# Patient Record
Sex: FEMALE | Race: WHITE | Employment: FULL TIME | URBAN - METROPOLITAN AREA
[De-identification: names, ages, dates, MRNs, and addresses within clinical notes are randomized per-mention and may not be internally consistent; named-entity substitution may affect disease eponyms.]

---

## 2017-06-20 ENCOUNTER — LAB ENCOUNTER (OUTPATIENT)
Dept: LAB | Age: 47
End: 2017-06-20
Attending: INTERNAL MEDICINE
Payer: COMMERCIAL

## 2017-06-20 DIAGNOSIS — M13.0 POLYARTHRITIS: ICD-10-CM

## 2017-06-20 DIAGNOSIS — M35.00 SICCA, UNSPECIFIED TYPE (HCC): ICD-10-CM

## 2017-06-20 DIAGNOSIS — R53.83 FATIGUE, UNSPECIFIED TYPE: ICD-10-CM

## 2017-06-20 PROCEDURE — 85652 RBC SED RATE AUTOMATED: CPT

## 2017-06-20 PROCEDURE — 80053 COMPREHEN METABOLIC PANEL: CPT

## 2017-06-20 PROCEDURE — 84443 ASSAY THYROID STIM HORMONE: CPT

## 2017-06-20 PROCEDURE — 36415 COLL VENOUS BLD VENIPUNCTURE: CPT

## 2017-06-20 PROCEDURE — 82607 VITAMIN B-12: CPT

## 2017-06-20 PROCEDURE — 86140 C-REACTIVE PROTEIN: CPT

## 2017-06-20 PROCEDURE — 86038 ANTINUCLEAR ANTIBODIES: CPT

## 2017-06-20 PROCEDURE — 86235 NUCLEAR ANTIGEN ANTIBODY: CPT

## 2017-06-20 PROCEDURE — 85025 COMPLETE CBC W/AUTO DIFF WBC: CPT

## 2017-06-20 PROCEDURE — 86431 RHEUMATOID FACTOR QUANT: CPT

## 2017-06-20 PROCEDURE — 86200 CCP ANTIBODY: CPT

## 2017-06-20 PROCEDURE — 82306 VITAMIN D 25 HYDROXY: CPT

## 2017-10-03 ENCOUNTER — LAB SERVICES (OUTPATIENT)
Dept: OTHER | Age: 47
End: 2017-10-03

## 2017-10-03 ENCOUNTER — CHARTING TRANS (OUTPATIENT)
Dept: OTHER | Age: 47
End: 2017-10-03

## 2017-10-03 LAB — RAPID STREP GROUP A: NORMAL

## 2017-10-03 ASSESSMENT — PAIN SCALES - GENERAL: PAINLEVEL_OUTOF10: 5

## 2018-04-21 ENCOUNTER — LAB SERVICES (OUTPATIENT)
Dept: OTHER | Age: 48
End: 2018-04-21

## 2018-04-21 ENCOUNTER — CHARTING TRANS (OUTPATIENT)
Dept: OTHER | Age: 48
End: 2018-04-21

## 2018-04-21 LAB — RAPID STREP GROUP A: NORMAL

## 2018-04-21 ASSESSMENT — PAIN SCALES - GENERAL: PAINLEVEL_OUTOF10: 0

## 2018-04-23 LAB — CULTURE STREP GRP A (STTH) HL: NORMAL

## 2018-10-16 PROCEDURE — 86038 ANTINUCLEAR ANTIBODIES: CPT | Performed by: INTERNAL MEDICINE

## 2018-10-16 PROCEDURE — 86235 NUCLEAR ANTIGEN ANTIBODY: CPT | Performed by: INTERNAL MEDICINE

## 2018-11-01 VITALS
WEIGHT: 120 LBS | TEMPERATURE: 98.3 F | HEIGHT: 64 IN | BODY MASS INDEX: 20.49 KG/M2 | RESPIRATION RATE: 18 BRPM | HEART RATE: 72 BPM

## 2018-11-02 VITALS
RESPIRATION RATE: 16 BRPM | HEART RATE: 94 BPM | DIASTOLIC BLOOD PRESSURE: 90 MMHG | TEMPERATURE: 97.9 F | SYSTOLIC BLOOD PRESSURE: 140 MMHG

## 2021-06-19 ENCOUNTER — LAB ENCOUNTER (OUTPATIENT)
Dept: LAB | Age: 51
End: 2021-06-19
Attending: INTERNAL MEDICINE
Payer: COMMERCIAL

## 2021-06-19 DIAGNOSIS — Z01.818 PRE-OP TESTING: ICD-10-CM

## 2021-08-05 ENCOUNTER — WALK IN (OUTPATIENT)
Dept: URGENT CARE | Age: 51
End: 2021-08-05

## 2021-08-05 VITALS
HEIGHT: 64 IN | TEMPERATURE: 97.9 F | SYSTOLIC BLOOD PRESSURE: 120 MMHG | RESPIRATION RATE: 16 BRPM | WEIGHT: 120 LBS | OXYGEN SATURATION: 99 % | HEART RATE: 79 BPM | DIASTOLIC BLOOD PRESSURE: 80 MMHG | BODY MASS INDEX: 20.49 KG/M2

## 2021-08-05 DIAGNOSIS — J01.90 ACUTE BACTERIAL SINUSITIS: Primary | ICD-10-CM

## 2021-08-05 DIAGNOSIS — R05.9 COUGH: ICD-10-CM

## 2021-08-05 DIAGNOSIS — B96.89 ACUTE BACTERIAL SINUSITIS: Primary | ICD-10-CM

## 2021-08-05 PROCEDURE — 99214 OFFICE O/P EST MOD 30 MIN: CPT | Performed by: NURSE PRACTITIONER

## 2021-08-05 RX ORDER — AZITHROMYCIN 250 MG/1
TABLET, FILM COATED ORAL
Qty: 6 TABLET | Refills: 0 | Status: SHIPPED | OUTPATIENT
Start: 2021-08-05

## 2021-08-05 RX ORDER — FLUTICASONE PROPIONATE 50 MCG
1 SPRAY, SUSPENSION (ML) NASAL DAILY
Qty: 16 G | Refills: 0 | Status: SHIPPED | OUTPATIENT
Start: 2021-08-05

## 2021-08-05 RX ORDER — METHYLPREDNISOLONE 4 MG/1
TABLET ORAL
COMMUNITY
Start: 2021-07-12

## 2021-08-05 RX ORDER — BENZONATATE 100 MG/1
100 CAPSULE ORAL 3 TIMES DAILY PRN
Qty: 21 CAPSULE | Refills: 0 | Status: SHIPPED | OUTPATIENT
Start: 2021-08-05

## 2021-08-05 ASSESSMENT — ENCOUNTER SYMPTOMS
GASTROINTESTINAL NEGATIVE: 1
STRIDOR: 0
SINUS PAIN: 1
CHEST TIGHTNESS: 0
COUGH: 1
SORE THROAT: 0
TROUBLE SWALLOWING: 0
CHILLS: 0
WHEEZING: 0
EYES NEGATIVE: 1
PSYCHIATRIC NEGATIVE: 1
SINUS PRESSURE: 1
FATIGUE: 0
FEVER: 0
SHORTNESS OF BREATH: 0
NEUROLOGICAL NEGATIVE: 1
RHINORRHEA: 1

## 2023-01-13 ENCOUNTER — WALK IN (OUTPATIENT)
Dept: URGENT CARE | Age: 53
End: 2023-01-13

## 2023-01-13 VITALS
SYSTOLIC BLOOD PRESSURE: 130 MMHG | BODY MASS INDEX: 20.49 KG/M2 | HEIGHT: 64 IN | OXYGEN SATURATION: 98 % | HEART RATE: 88 BPM | WEIGHT: 120 LBS | DIASTOLIC BLOOD PRESSURE: 60 MMHG | TEMPERATURE: 98.7 F | RESPIRATION RATE: 18 BRPM

## 2023-01-13 DIAGNOSIS — H92.01 OTALGIA OF RIGHT EAR: Primary | ICD-10-CM

## 2023-01-13 PROBLEM — H92.02 LEFT EAR PAIN: Status: ACTIVE | Noted: 2023-01-13

## 2023-01-13 PROCEDURE — 99213 OFFICE O/P EST LOW 20 MIN: CPT | Performed by: NURSE PRACTITIONER

## 2023-01-13 ASSESSMENT — ENCOUNTER SYMPTOMS
COUGH: 0
UNEXPECTED WEIGHT CHANGE: 0
FEVER: 1
DIAPHORESIS: 0
APPETITE CHANGE: 0
FATIGUE: 0
ACTIVITY CHANGE: 0
HEADACHES: 0
CHILLS: 0

## 2023-02-25 ENCOUNTER — OFFICE VISIT (OUTPATIENT)
Dept: FAMILY MEDICINE CLINIC | Facility: CLINIC | Age: 53
End: 2023-02-25
Payer: COMMERCIAL

## 2023-02-25 VITALS
SYSTOLIC BLOOD PRESSURE: 138 MMHG | OXYGEN SATURATION: 96 % | TEMPERATURE: 97 F | BODY MASS INDEX: 21 KG/M2 | HEIGHT: 64 IN | RESPIRATION RATE: 18 BRPM | DIASTOLIC BLOOD PRESSURE: 90 MMHG | HEART RATE: 90 BPM | WEIGHT: 123 LBS

## 2023-02-25 DIAGNOSIS — R21 RASH AND NONSPECIFIC SKIN ERUPTION: Primary | ICD-10-CM

## 2023-02-25 PROCEDURE — 3080F DIAST BP >= 90 MM HG: CPT | Performed by: NURSE PRACTITIONER

## 2023-02-25 PROCEDURE — 99203 OFFICE O/P NEW LOW 30 MIN: CPT | Performed by: NURSE PRACTITIONER

## 2023-02-25 PROCEDURE — 3008F BODY MASS INDEX DOCD: CPT | Performed by: NURSE PRACTITIONER

## 2023-02-25 PROCEDURE — 3075F SYST BP GE 130 - 139MM HG: CPT | Performed by: NURSE PRACTITIONER

## 2023-02-25 RX ORDER — METHYLPREDNISOLONE 4 MG/1
TABLET ORAL
Qty: 1 EACH | Refills: 0 | Status: SHIPPED | OUTPATIENT
Start: 2023-02-25

## 2023-04-24 ENCOUNTER — ANESTHESIA EVENT (OUTPATIENT)
Dept: SURGERY | Facility: HOSPITAL | Age: 53
End: 2023-04-24
Payer: COMMERCIAL

## 2023-04-24 ENCOUNTER — APPOINTMENT (OUTPATIENT)
Dept: CT IMAGING | Facility: HOSPITAL | Age: 53
DRG: 909 | End: 2023-04-24
Attending: EMERGENCY MEDICINE
Payer: COMMERCIAL

## 2023-04-24 ENCOUNTER — HOSPITAL ENCOUNTER (INPATIENT)
Facility: HOSPITAL | Age: 53
LOS: 2 days | Discharge: HOME | DRG: 909 | End: 2023-04-26
Attending: EMERGENCY MEDICINE | Admitting: HOSPITALIST
Payer: COMMERCIAL

## 2023-04-24 ENCOUNTER — ANESTHESIA (OUTPATIENT)
Dept: SURGERY | Facility: HOSPITAL | Age: 53
End: 2023-04-24
Payer: COMMERCIAL

## 2023-04-24 DIAGNOSIS — K63.1 PERFORATED SIGMOID COLON (HCC): Primary | ICD-10-CM

## 2023-04-24 DIAGNOSIS — K63.1 COLON PERFORATION (HCC): ICD-10-CM

## 2023-04-24 DIAGNOSIS — K63.1 PERFORATED SIGMOID COLON (HCC): ICD-10-CM

## 2023-04-24 DIAGNOSIS — K91.89 OTHER POSTPROCEDURAL COMPLICATIONS AND DISORDERS OF DIGESTIVE SYSTEM: Primary | ICD-10-CM

## 2023-04-24 PROBLEM — Z83.719 FAMILY HISTORY OF COLONIC POLYPS: Status: ACTIVE | Noted: 2023-04-24

## 2023-04-24 PROBLEM — D12.0 BENIGN NEOPLASM OF CECUM: Status: ACTIVE | Noted: 2023-04-24

## 2023-04-24 PROBLEM — Z80.0 FAMILY HISTORY OF COLON CANCER: Status: ACTIVE | Noted: 2023-04-24

## 2023-04-24 PROBLEM — Z12.11 SPECIAL SCREENING FOR MALIGNANT NEOPLASM OF COLON: Status: ACTIVE | Noted: 2023-04-24

## 2023-04-24 PROBLEM — Z83.71 FAMILY HISTORY OF COLONIC POLYPS: Status: ACTIVE | Noted: 2023-04-24

## 2023-04-24 PROBLEM — D12.4 BENIGN NEOPLASM OF DESCENDING COLON: Status: ACTIVE | Noted: 2023-04-24

## 2023-04-24 LAB
ALBUMIN SERPL-MCNC: 3.6 G/DL (ref 3.4–5)
ALBUMIN/GLOB SERPL: 1.4 {RATIO} (ref 1–2)
ALP LIVER SERPL-CCNC: 70 U/L
ALT SERPL-CCNC: 23 U/L
ANION GAP SERPL CALC-SCNC: 5 MMOL/L (ref 0–18)
AST SERPL-CCNC: 16 U/L (ref 15–37)
BASOPHILS # BLD AUTO: 0.06 X10(3) UL (ref 0–0.2)
BASOPHILS NFR BLD AUTO: 0.6 %
BILIRUB SERPL-MCNC: 0.5 MG/DL (ref 0.1–2)
BUN BLD-MCNC: 14 MG/DL (ref 7–18)
CALCIUM BLD-MCNC: 9.3 MG/DL (ref 8.5–10.1)
CHLORIDE SERPL-SCNC: 115 MMOL/L (ref 98–112)
CO2 SERPL-SCNC: 22 MMOL/L (ref 21–32)
CREAT BLD-MCNC: 0.49 MG/DL
EOSINOPHIL # BLD AUTO: 0.12 X10(3) UL (ref 0–0.7)
EOSINOPHIL NFR BLD AUTO: 1.3 %
ERYTHROCYTE [DISTWIDTH] IN BLOOD BY AUTOMATED COUNT: 12.1 %
GFR SERPLBLD BASED ON 1.73 SQ M-ARVRAT: 113 ML/MIN/1.73M2 (ref 60–?)
GLOBULIN PLAS-MCNC: 2.6 G/DL (ref 2.8–4.4)
GLUCOSE BLD-MCNC: 81 MG/DL (ref 70–99)
HCT VFR BLD AUTO: 42.1 %
HGB BLD-MCNC: 14.4 G/DL
IMM GRANULOCYTES # BLD AUTO: 0.03 X10(3) UL (ref 0–1)
IMM GRANULOCYTES NFR BLD: 0.3 %
LACTATE SERPL-SCNC: 0.8 MMOL/L (ref 0.4–2)
LYMPHOCYTES # BLD AUTO: 2.66 X10(3) UL (ref 1–4)
LYMPHOCYTES NFR BLD AUTO: 28.7 %
MCH RBC QN AUTO: 30.5 PG (ref 26–34)
MCHC RBC AUTO-ENTMCNC: 34.2 G/DL (ref 31–37)
MCV RBC AUTO: 89.2 FL
MONOCYTES # BLD AUTO: 0.55 X10(3) UL (ref 0.1–1)
MONOCYTES NFR BLD AUTO: 5.9 %
NEUTROPHILS # BLD AUTO: 5.84 X10 (3) UL (ref 1.5–7.7)
NEUTROPHILS # BLD AUTO: 5.84 X10(3) UL (ref 1.5–7.7)
NEUTROPHILS NFR BLD AUTO: 63.2 %
OSMOLALITY SERPL CALC.SUM OF ELEC: 294 MOSM/KG (ref 275–295)
PLATELET # BLD AUTO: 276 10(3)UL (ref 150–450)
POTASSIUM SERPL-SCNC: 4.1 MMOL/L (ref 3.5–5.1)
PROT SERPL-MCNC: 6.2 G/DL (ref 6.4–8.2)
RBC # BLD AUTO: 4.72 X10(6)UL
SODIUM SERPL-SCNC: 142 MMOL/L (ref 136–145)
WBC # BLD AUTO: 9.3 X10(3) UL (ref 4–11)

## 2023-04-24 PROCEDURE — 74177 CT ABD & PELVIS W/CONTRAST: CPT | Performed by: EMERGENCY MEDICINE

## 2023-04-24 PROCEDURE — 99223 1ST HOSP IP/OBS HIGH 75: CPT | Performed by: STUDENT IN AN ORGANIZED HEALTH CARE EDUCATION/TRAINING PROGRAM

## 2023-04-24 PROCEDURE — 0DQN0ZZ REPAIR SIGMOID COLON, OPEN APPROACH: ICD-10-PCS | Performed by: STUDENT IN AN ORGANIZED HEALTH CARE EDUCATION/TRAINING PROGRAM

## 2023-04-24 PROCEDURE — 3074F SYST BP LT 130 MM HG: CPT | Performed by: INTERNAL MEDICINE

## 2023-04-24 PROCEDURE — 99223 1ST HOSP IP/OBS HIGH 75: CPT | Performed by: INTERNAL MEDICINE

## 2023-04-24 PROCEDURE — 3078F DIAST BP <80 MM HG: CPT | Performed by: INTERNAL MEDICINE

## 2023-04-24 PROCEDURE — 0DJD4ZZ INSPECTION OF LOWER INTESTINAL TRACT, PERCUTANEOUS ENDOSCOPIC APPROACH: ICD-10-PCS | Performed by: STUDENT IN AN ORGANIZED HEALTH CARE EDUCATION/TRAINING PROGRAM

## 2023-04-24 RX ORDER — MIDAZOLAM HYDROCHLORIDE 1 MG/ML
INJECTION INTRAMUSCULAR; INTRAVENOUS AS NEEDED
Status: DISCONTINUED | OUTPATIENT
Start: 2023-04-24 | End: 2023-04-24 | Stop reason: SURG

## 2023-04-24 RX ORDER — HEPARIN SODIUM 5000 [USP'U]/ML
5000 INJECTION, SOLUTION INTRAVENOUS; SUBCUTANEOUS EVERY 8 HOURS SCHEDULED
Status: DISCONTINUED | OUTPATIENT
Start: 2023-04-24 | End: 2023-04-25

## 2023-04-24 RX ORDER — CLINDAMYCIN PHOSPHATE 900 MG/50ML
900 INJECTION INTRAVENOUS ONCE
Status: COMPLETED | OUTPATIENT
Start: 2023-04-24 | End: 2023-04-24

## 2023-04-24 RX ORDER — ACETAMINOPHEN 500 MG
1000 TABLET ORAL EVERY 8 HOURS SCHEDULED
Status: DISCONTINUED | OUTPATIENT
Start: 2023-04-24 | End: 2023-04-26

## 2023-04-24 RX ORDER — PROCHLORPERAZINE EDISYLATE 5 MG/ML
5 INJECTION INTRAMUSCULAR; INTRAVENOUS EVERY 8 HOURS PRN
Status: DISCONTINUED | OUTPATIENT
Start: 2023-04-24 | End: 2023-04-26

## 2023-04-24 RX ORDER — KETOROLAC TROMETHAMINE 30 MG/ML
30 INJECTION, SOLUTION INTRAMUSCULAR; INTRAVENOUS EVERY 6 HOURS
Status: DISCONTINUED | OUTPATIENT
Start: 2023-04-25 | End: 2023-04-26

## 2023-04-24 RX ORDER — ONDANSETRON 2 MG/ML
4 INJECTION INTRAMUSCULAR; INTRAVENOUS EVERY 6 HOURS PRN
Status: DISCONTINUED | OUTPATIENT
Start: 2023-04-24 | End: 2023-04-24 | Stop reason: HOSPADM

## 2023-04-24 RX ORDER — METRONIDAZOLE 500 MG/100ML
500 INJECTION, SOLUTION INTRAVENOUS EVERY 8 HOURS
Status: DISCONTINUED | OUTPATIENT
Start: 2023-04-25 | End: 2023-04-26

## 2023-04-24 RX ORDER — BUPIVACAINE HYDROCHLORIDE 2.5 MG/ML
INJECTION, SOLUTION EPIDURAL; INFILTRATION; INTRACAUDAL AS NEEDED
Status: DISCONTINUED | OUTPATIENT
Start: 2023-04-24 | End: 2023-04-24 | Stop reason: HOSPADM

## 2023-04-24 RX ORDER — HYDROMORPHONE HYDROCHLORIDE 1 MG/ML
0.2 INJECTION, SOLUTION INTRAMUSCULAR; INTRAVENOUS; SUBCUTANEOUS EVERY 5 MIN PRN
Status: DISCONTINUED | OUTPATIENT
Start: 2023-04-24 | End: 2023-04-24 | Stop reason: HOSPADM

## 2023-04-24 RX ORDER — MORPHINE SULFATE 2 MG/ML
1 INJECTION, SOLUTION INTRAMUSCULAR; INTRAVENOUS EVERY 2 HOUR PRN
Status: DISCONTINUED | OUTPATIENT
Start: 2023-04-24 | End: 2023-04-26

## 2023-04-24 RX ORDER — MORPHINE SULFATE 4 MG/ML
4 INJECTION, SOLUTION INTRAMUSCULAR; INTRAVENOUS EVERY 2 HOUR PRN
Status: DISCONTINUED | OUTPATIENT
Start: 2023-04-24 | End: 2023-04-26

## 2023-04-24 RX ORDER — SODIUM CHLORIDE, SODIUM LACTATE, POTASSIUM CHLORIDE, CALCIUM CHLORIDE 600; 310; 30; 20 MG/100ML; MG/100ML; MG/100ML; MG/100ML
INJECTION, SOLUTION INTRAVENOUS CONTINUOUS PRN
Status: DISCONTINUED | OUTPATIENT
Start: 2023-04-24 | End: 2023-04-24 | Stop reason: SURG

## 2023-04-24 RX ORDER — HYDROMORPHONE HYDROCHLORIDE 1 MG/ML
0.4 INJECTION, SOLUTION INTRAMUSCULAR; INTRAVENOUS; SUBCUTANEOUS EVERY 2 HOUR PRN
Status: DISCONTINUED | OUTPATIENT
Start: 2023-04-24 | End: 2023-04-26

## 2023-04-24 RX ORDER — HYDROMORPHONE HYDROCHLORIDE 1 MG/ML
0.8 INJECTION, SOLUTION INTRAMUSCULAR; INTRAVENOUS; SUBCUTANEOUS EVERY 2 HOUR PRN
Status: DISCONTINUED | OUTPATIENT
Start: 2023-04-24 | End: 2023-04-26

## 2023-04-24 RX ORDER — DEXAMETHASONE SODIUM PHOSPHATE 4 MG/ML
VIAL (ML) INJECTION AS NEEDED
Status: DISCONTINUED | OUTPATIENT
Start: 2023-04-24 | End: 2023-04-24 | Stop reason: SURG

## 2023-04-24 RX ORDER — OXYCODONE HYDROCHLORIDE 10 MG/1
10 TABLET ORAL EVERY 4 HOURS PRN
Status: DISCONTINUED | OUTPATIENT
Start: 2023-04-24 | End: 2023-04-26

## 2023-04-24 RX ORDER — HYDROMORPHONE HYDROCHLORIDE 1 MG/ML
0.6 INJECTION, SOLUTION INTRAMUSCULAR; INTRAVENOUS; SUBCUTANEOUS EVERY 5 MIN PRN
Status: DISCONTINUED | OUTPATIENT
Start: 2023-04-24 | End: 2023-04-24 | Stop reason: HOSPADM

## 2023-04-24 RX ORDER — PROCHLORPERAZINE EDISYLATE 5 MG/ML
5 INJECTION INTRAMUSCULAR; INTRAVENOUS EVERY 8 HOURS PRN
Status: DISCONTINUED | OUTPATIENT
Start: 2023-04-24 | End: 2023-04-24 | Stop reason: HOSPADM

## 2023-04-24 RX ORDER — ONDANSETRON 2 MG/ML
4 INJECTION INTRAMUSCULAR; INTRAVENOUS EVERY 6 HOURS PRN
Status: DISCONTINUED | OUTPATIENT
Start: 2023-04-24 | End: 2023-04-26

## 2023-04-24 RX ORDER — OXYCODONE HYDROCHLORIDE 5 MG/1
5 TABLET ORAL EVERY 4 HOURS PRN
Status: DISCONTINUED | OUTPATIENT
Start: 2023-04-24 | End: 2023-04-26

## 2023-04-24 RX ORDER — NALOXONE HYDROCHLORIDE 0.4 MG/ML
80 INJECTION, SOLUTION INTRAMUSCULAR; INTRAVENOUS; SUBCUTANEOUS AS NEEDED
Status: DISCONTINUED | OUTPATIENT
Start: 2023-04-24 | End: 2023-04-24 | Stop reason: HOSPADM

## 2023-04-24 RX ORDER — METRONIDAZOLE 500 MG/100ML
500 INJECTION, SOLUTION INTRAVENOUS ONCE
Status: COMPLETED | OUTPATIENT
Start: 2023-04-24 | End: 2023-04-26

## 2023-04-24 RX ORDER — CIPROFLOXACIN 2 MG/ML
400 INJECTION, SOLUTION INTRAVENOUS EVERY 12 HOURS
Status: DISCONTINUED | OUTPATIENT
Start: 2023-04-24 | End: 2023-04-24

## 2023-04-24 RX ORDER — ACETAMINOPHEN 500 MG
500 TABLET ORAL EVERY 4 HOURS PRN
Status: DISCONTINUED | OUTPATIENT
Start: 2023-04-24 | End: 2023-04-26

## 2023-04-24 RX ORDER — SODIUM CHLORIDE, SODIUM LACTATE, POTASSIUM CHLORIDE, CALCIUM CHLORIDE 600; 310; 30; 20 MG/100ML; MG/100ML; MG/100ML; MG/100ML
INJECTION, SOLUTION INTRAVENOUS CONTINUOUS
Status: DISCONTINUED | OUTPATIENT
Start: 2023-04-24 | End: 2023-04-24 | Stop reason: HOSPADM

## 2023-04-24 RX ORDER — LIDOCAINE HYDROCHLORIDE 10 MG/ML
INJECTION, SOLUTION EPIDURAL; INFILTRATION; INTRACAUDAL; PERINEURAL AS NEEDED
Status: DISCONTINUED | OUTPATIENT
Start: 2023-04-24 | End: 2023-04-24 | Stop reason: SURG

## 2023-04-24 RX ORDER — SODIUM CHLORIDE 9 MG/ML
INJECTION, SOLUTION INTRAVENOUS CONTINUOUS
Status: DISCONTINUED | OUTPATIENT
Start: 2023-04-24 | End: 2023-04-25

## 2023-04-24 RX ORDER — CIPROFLOXACIN 2 MG/ML
400 INJECTION, SOLUTION INTRAVENOUS EVERY 12 HOURS
Status: DISCONTINUED | OUTPATIENT
Start: 2023-04-25 | End: 2023-04-26

## 2023-04-24 RX ORDER — ONDANSETRON 2 MG/ML
INJECTION INTRAMUSCULAR; INTRAVENOUS AS NEEDED
Status: DISCONTINUED | OUTPATIENT
Start: 2023-04-24 | End: 2023-04-24 | Stop reason: SURG

## 2023-04-24 RX ORDER — HYDROMORPHONE HYDROCHLORIDE 1 MG/ML
0.4 INJECTION, SOLUTION INTRAMUSCULAR; INTRAVENOUS; SUBCUTANEOUS EVERY 5 MIN PRN
Status: DISCONTINUED | OUTPATIENT
Start: 2023-04-24 | End: 2023-04-24 | Stop reason: HOSPADM

## 2023-04-24 RX ORDER — ROCURONIUM BROMIDE 10 MG/ML
INJECTION, SOLUTION INTRAVENOUS AS NEEDED
Status: DISCONTINUED | OUTPATIENT
Start: 2023-04-24 | End: 2023-04-24 | Stop reason: SURG

## 2023-04-24 RX ORDER — MORPHINE SULFATE 2 MG/ML
2 INJECTION, SOLUTION INTRAMUSCULAR; INTRAVENOUS EVERY 2 HOUR PRN
Status: DISCONTINUED | OUTPATIENT
Start: 2023-04-24 | End: 2023-04-26

## 2023-04-24 RX ADMIN — LIDOCAINE HYDROCHLORIDE 50 MG: 10 INJECTION, SOLUTION EPIDURAL; INFILTRATION; INTRACAUDAL; PERINEURAL at 19:51:00

## 2023-04-24 RX ADMIN — ROCURONIUM BROMIDE 50 MG: 10 INJECTION, SOLUTION INTRAVENOUS at 19:51:00

## 2023-04-24 RX ADMIN — SODIUM CHLORIDE, SODIUM LACTATE, POTASSIUM CHLORIDE, CALCIUM CHLORIDE: 600; 310; 30; 20 INJECTION, SOLUTION INTRAVENOUS at 22:28:00

## 2023-04-24 RX ADMIN — ONDANSETRON 4 MG: 2 INJECTION INTRAMUSCULAR; INTRAVENOUS at 22:03:00

## 2023-04-24 RX ADMIN — SODIUM CHLORIDE, SODIUM LACTATE, POTASSIUM CHLORIDE, CALCIUM CHLORIDE: 600; 310; 30; 20 INJECTION, SOLUTION INTRAVENOUS at 19:51:00

## 2023-04-24 RX ADMIN — CLINDAMYCIN PHOSPHATE 900 MG: 900 INJECTION INTRAVENOUS at 19:57:00

## 2023-04-24 RX ADMIN — MIDAZOLAM HYDROCHLORIDE 2 MG: 1 INJECTION INTRAMUSCULAR; INTRAVENOUS at 19:49:00

## 2023-04-24 RX ADMIN — DEXAMETHASONE SODIUM PHOSPHATE 4 MG: 4 MG/ML VIAL (ML) INJECTION at 19:56:00

## 2023-04-24 NOTE — ED INITIAL ASSESSMENT (HPI)
Patient in after colonoscopy at [facility]. Patient denies abd pain or any complaints. However pt being sent for rule out bowel perforation. First scope of patient's. Multiple polyps removed. Perforation suspected per MD notes after scope. Four clips applied close to perforation site of suspicion per notes.

## 2023-04-24 NOTE — ED QUICK NOTES
Orders for admission, patient is aware of plan and ready to go upstairs. Any questions, please call ED RN Sobia Manuel at extension [phone].      Patient Covid vaccination status: Fully vaccinated     COVID Test Ordered in ED: None    COVID Suspicion at Admission: N/A    Running Infusions:      Mental Status/LOC at time of transport: AOx4    Other pertinent information:   CIWA score: N/A   NIH score:  N/A

## 2023-04-25 LAB
ALBUMIN SERPL-MCNC: 3 G/DL (ref 3.4–5)
ALBUMIN/GLOB SERPL: 1.2 {RATIO} (ref 1–2)
ALP LIVER SERPL-CCNC: 56 U/L
ALT SERPL-CCNC: 16 U/L
ANION GAP SERPL CALC-SCNC: 2 MMOL/L (ref 0–18)
APTT PPP: 32.6 SECONDS (ref 23.3–35.6)
AST SERPL-CCNC: 15 U/L (ref 15–37)
BASOPHILS # BLD AUTO: 0.05 X10(3) UL (ref 0–0.2)
BASOPHILS NFR BLD AUTO: 0.2 %
BILIRUB SERPL-MCNC: 0.7 MG/DL (ref 0.1–2)
BUN BLD-MCNC: 10 MG/DL (ref 7–18)
CALCIUM BLD-MCNC: 8.3 MG/DL (ref 8.5–10.1)
CHLORIDE SERPL-SCNC: 109 MMOL/L (ref 98–112)
CO2 SERPL-SCNC: 26 MMOL/L (ref 21–32)
CREAT BLD-MCNC: 0.63 MG/DL
EOSINOPHIL # BLD AUTO: 0 X10(3) UL (ref 0–0.7)
EOSINOPHIL NFR BLD AUTO: 0 %
ERYTHROCYTE [DISTWIDTH] IN BLOOD BY AUTOMATED COUNT: 11.9 %
GFR SERPLBLD BASED ON 1.73 SQ M-ARVRAT: 106 ML/MIN/1.73M2 (ref 60–?)
GLOBULIN PLAS-MCNC: 2.6 G/DL (ref 2.8–4.4)
GLUCOSE BLD-MCNC: 150 MG/DL (ref 70–99)
HCT VFR BLD AUTO: 38 %
HGB BLD-MCNC: 13 G/DL
IMM GRANULOCYTES # BLD AUTO: 0.13 X10(3) UL (ref 0–1)
IMM GRANULOCYTES NFR BLD: 0.6 %
INR BLD: 1.07 (ref 0.85–1.16)
LYMPHOCYTES # BLD AUTO: 0.71 X10(3) UL (ref 1–4)
LYMPHOCYTES NFR BLD AUTO: 3.5 %
MCH RBC QN AUTO: 30.6 PG (ref 26–34)
MCHC RBC AUTO-ENTMCNC: 34.2 G/DL (ref 31–37)
MCV RBC AUTO: 89.4 FL
MONOCYTES # BLD AUTO: 0.95 X10(3) UL (ref 0.1–1)
MONOCYTES NFR BLD AUTO: 4.7 %
NEUTROPHILS # BLD AUTO: 18.27 X10 (3) UL (ref 1.5–7.7)
NEUTROPHILS # BLD AUTO: 18.27 X10(3) UL (ref 1.5–7.7)
NEUTROPHILS NFR BLD AUTO: 91 %
OSMOLALITY SERPL CALC.SUM OF ELEC: 286 MOSM/KG (ref 275–295)
PLATELET # BLD AUTO: 245 10(3)UL (ref 150–450)
POTASSIUM SERPL-SCNC: 3.9 MMOL/L (ref 3.5–5.1)
PROT SERPL-MCNC: 5.6 G/DL (ref 6.4–8.2)
PROTHROMBIN TIME: 13.9 SECONDS (ref 11.6–14.8)
RBC # BLD AUTO: 4.25 X10(6)UL
SODIUM SERPL-SCNC: 137 MMOL/L (ref 136–145)
WBC # BLD AUTO: 20.1 X10(3) UL (ref 4–11)

## 2023-04-25 PROCEDURE — 99232 SBSQ HOSP IP/OBS MODERATE 35: CPT | Performed by: HOSPITALIST

## 2023-04-25 RX ORDER — MELATONIN
3 NIGHTLY PRN
Status: DISCONTINUED | OUTPATIENT
Start: 2023-04-25 | End: 2023-04-26

## 2023-04-25 RX ORDER — HEPARIN SODIUM 5000 [USP'U]/ML
5000 INJECTION, SOLUTION INTRAVENOUS; SUBCUTANEOUS EVERY 8 HOURS
Status: DISCONTINUED | OUTPATIENT
Start: 2023-04-25 | End: 2023-04-26

## 2023-04-25 RX ORDER — SIMETHICONE 80 MG
80 TABLET,CHEWABLE ORAL 4 TIMES DAILY PRN
Status: DISCONTINUED | OUTPATIENT
Start: 2023-04-25 | End: 2023-04-26

## 2023-04-25 NOTE — PROGRESS NOTES
Patient A&Ox4. Vital signs stable on room air. Pain 8/10; toradol, and dilaudid given. asleep upon reassessment. Abdomen soft, flat, nondistended, and tender. Bowel sounds present; active. Patient not passing gas. Denies nausea, vomiting, diarrhea. Lap sites and midline incision clean, dry, intact, closed with skin glue and open to air. Clear liquid diet being tolerated well. Patient due to void after removal of styles catheter. JOSÉ MIGUEL drain to suction with serosanguinous output. Patient and  updated on plan of care. Questions and concerns discussed.

## 2023-04-25 NOTE — OPERATIVE REPORT
OPERATIVE NOTE    Dang Harden Location: OR   CSN [id number] MRN [id number]   Admission Date 4/24/2023 Operation Date 4/24/2023   Attending Physician Abdirashid Lam MD Operating Physician Vern Snellen, MD     PREOPERATIVE DIAGNOSIS  Colonic perforation    POSTOPERATIVE DIAGNOSIS  Perforation of the sigmoid colon    PROCEDURE PERFORMED  Diagnostic laparoscopy  Laparoscopic mobilization of sigmoid colon  Open debridement of sigmoid colon perforation  Primary repair sigmoid perforation    Beata Parkinson MD    ASSISTANTS  Sai Weiss MD    ANESTHESIA  General    INDICATIONS  This is a 67-year-old woman who presented to the outpatient surgical center for colonoscopy earlier today. During the colonoscopy patient underwent multiple Endo mucosal resections for polyps. In the sigmoid/descending colon, the gastroenterologist was concerned that there was a perforation. Patient was sent to the emergency room for further work-up and management. On CT imaging, patient was found to have extravasation of contrast as well as foci of free air concerning for continued perforation. Patient was outside the window for endoscopic repair. General surgery was consulted and exploration with repair of the colon was indicated. FINDINGS   Approximately 1.5 cm perforation in the mesenteric side of the sigmoid colon, 4 endoscopic clips noted near the perforation but the colonic wall was still , no gross spillage of any stool or colonic contents    FINDINGS/DESCRIPTION OF PROCEDURE  After informed consent, patient was brought to the operating room and placed in supine position. Bilateral SCDs were placed and pre-operative antibiotics administered. Anesthesia was induced without any complication. Patient was further placed in the lithotomy position with rectal irrigation and styles placement. Patient was prepped and draped in the usual sterile fashion.  Time out was performed confirming patient, procedure, and site.    Veress needle was used to gain pneumoperitoneum. Using blade, small incision for 5 mm port was made at the site of Sabillon's point. Veress needle was inserted with audible clicks. Pneumoperitoneum was created with CO2. Optiview was used to gain entry into the abdomen. Upon entrance, no injury to any of the bowel or omentum was noted. Two more 5 mm ports were placed with direct visualization at the right periumbilical and right lower quadrant. Using graspers, the sigmoid colon and descending colon were explored looking for perforation. On the distal descending/proximal sigmoid, there was an area of hyperemia on the mesenteric side. At this point, we decided to mobilize the white line of Toldt and explore this area more thoroughly. Using the Enseal, the white line of Toldt was taken down superiorly and inferiorly to the area of hyperemic intestine, being careful to identify the ureter. Once the colon was adequately mobilized, the area of perforation was still not able to be identified. It was suspected at this time that perforation was in the mesentery of the colon. Because of this, we decided to make a midline incision and openly explore the colon. The umbilicus and pubis were both identified, marking the midline. Approximately 5 cm incision was made. Subcutaneous tissues were divided until the abdominal cavity was encountered. The sigmoid colon was then eviscerated. During colonoscopy, patient had ligating clips placed in an attempt to close the opening. These were palpated through the colonic wall. Site of perforation was just distal to this. Using this knowledge, we began exploring the mesentery just distal to the palpated clips. Area of perforation was quickly identified and found to be approximately 1.5 cm. The ligating clips were identified through the perforation and removed. These were sent for pathology as colonic clips. There was no gross spillage of stool.   Because the perforation was less than 50% of the colonic wall, we decided to do a primary repair. The edges of the perforation were debrided back to healthy colonic tissue. Using a 3-0 Vicryl, a Michelle stitch was placed. The repair was then imbricated with 3-0 silk Lembert sutures. Patient had a wide open lumen with no evidence of stricturing. Abdomen was irrigated with sterile saline and the midline incision closed in layers. Peritoneum was closed with an 0 Vicryl suture. Fascia was then closed with a #1 looped PDS. Subcutaneous tissues were closed with 3-0 Vicryl deep dermal.  Skin was closed with a 4-0 Monocryl running subcuticular. Port site were closed with 4-0 Monocryl. Wounds were cleaned and dressed with Dermabond. At the end of the case, all counts were correct. Patient tolerated procedure well. Patient was awakened and transferred to PACU in a hemodynamically stable condition.     SPECIMENS REMOVED  Colonic clips    ESTIMATED BLOOD LOSS  30 ml    COMPLICATIONS  None     Romel Mercado MD

## 2023-04-25 NOTE — ANESTHESIA POSTPROCEDURE EVALUATION
Joaquina Patient Status:  Emergency   Age/Gender 47year old female MRN [id number]   Location [address] [facility] Attending Julio Cesar Askew MD   [facility] Day # 0 PCP CHRISTOPHER Fajardo       Anesthesia Post-op Note    LAPAROSCOPIC LEFT COLON RESECTION    Procedure Summary     Date: 04/24/23 Room / Location: [address] [place] [address] OR 09 / [address] [facility] OR    Anesthesia Start: 1946 Anesthesia Stop: 0803    Procedure: LAPAROSCOPIC LEFT COLON RESECTION (Abdomen) Diagnosis:       Perforated sigmoid colon ([address]      (same as pre-op)    Surgeons: Julio Cesar Askew MD Anesthesiologist: Hugh Merida MD    Anesthesia Type: general ASA Status: 2 - Emergent          Anesthesia Type: general    Vitals Value Taken Time   /81 04/24/23 2227   Temp 97 04/24/23 2229   Pulse 106 04/24/23 2228   Resp 13 04/24/23 2228   SpO2 99 % 04/24/23 2228   Vitals shown include unvalidated device data. Patient Location: PACU    Anesthesia Type: general    Airway Patency: patent    Postop Pain Control: adequate    Mental Status: mildly sedated but able to meaningfully participate in the post-anesthesia evaluation    Nausea/Vomiting: none    Cardiopulmonary/Hydration status: stable euvolemic    Complications: no apparent anesthesia related complications    Postop vital signs: stable    Dental Exam: Unchanged from Preop    Patient to be discharged from PACU when criteria met.

## 2023-04-25 NOTE — PLAN OF CARE
Patient A&Ox4, RA, up w/ SB assist. Voiding. Reports moderate abdominal pain. Receiving scheduled toradol and tylenol for pain. Saline locked. Tolerating clear liquids. Receiving IV abx. Re Rash/redness noted to L hand. Patient denies pain or itching. MD aware. RN to continue monitoring rash and notify MD if worsens or spreads. Patient reports having 1 small \"dust colored\" BM. JOSÉ MIGUEL drain intact with SS drainage. Plan of care discussed w/ patient and family at bedside.     Problem: Patient/Family Goals  Goal: Patient/Family Long Term Goal  Description: Patient's Long Term Goal: Discharge home     Interventions:  - advance diet   - ambulate as tolerated   - monitor drain output  - See additional Care Plan goals for specific interventions  Outcome: Progressing  Goal: Patient/Family Short Term Goal  Description: Patient's Short Term Goal: tolerate pain     Interventions:   - prn pain meds  - See additional Care Plan goals for specific interventions  Outcome: Progressing     Problem: PAIN - ADULT  Goal: Verbalizes/displays adequate comfort level or patient's stated pain goal  Description: INTERVENTIONS:  - Encourage pt to monitor pain and request assistance  - Assess pain using appropriate pain scale  - Administer analgesics based on type and severity of pain and evaluate response  - Implement non-pharmacological measures as appropriate and evaluate response  - Consider cultural and social influences on pain and pain management  - Manage/alleviate anxiety  - Utilize distraction and/or relaxation techniques  - Monitor for opioid side effects  - Notify MD/LIP if interventions unsuccessful or patient reports new pain  - Anticipate increased pain with activity and pre-medicate as appropriate  Outcome: Progressing     Problem: GASTROINTESTINAL - ADULT  Goal: Minimal or absence of nausea and vomiting  Description: INTERVENTIONS:  - Maintain adequate hydration with IV or PO as ordered and tolerated  - Nasogastric tube to low intermittent suction as ordered  - Evaluate effectiveness of ordered antiemetic medications  - Provide nonpharmacologic comfort measures as appropriate  - Advance diet as tolerated, if ordered  - Obtain nutritional consult as needed  - Evaluate fluid balance  Outcome: Progressing  Goal: Maintains or returns to baseline bowel function  Description: INTERVENTIONS:  - Assess bowel function  - Maintain adequate hydration with IV or PO as ordered and tolerated  - Evaluate effectiveness of GI medications  - Encourage mobilization and activity  - Obtain nutritional consult as needed  - Establish a toileting routine/schedule  - Consider collaborating with pharmacy to review patient's medication profile  Outcome: Progressing  Goal: Maintains adequate nutritional intake (undernourished)  Description: INTERVENTIONS:  - Monitor percentage of each meal consumed  - Identify factors contributing to decreased intake, treat as appropriate  - Assist with meals as needed  - Monitor I&O, WT and lab values  - Obtain nutritional consult as needed  - Optimize oral hygiene and moisture  - Encourage food from home; allow for food preferences  - Enhance eating environment  Outcome: Progressing  Goal: Achieves appropriate nutritional intake (bariatric)  Description: INTERVENTIONS:  - Monitor for over-consumption  - Identify factors contributing to increased intake, treat as appropriate  - Monitor I&O, WT and lab values  - Obtain nutritional consult as needed  - Evaluate psychosocial factors contributing to over-consumption  Outcome: Progressing

## 2023-04-25 NOTE — ADDENDUM NOTE
Addendum  created 04/24/23 2359 by Antonette Recinos MD    Intraprocedure Event edited, Intraprocedure Meds edited

## 2023-04-25 NOTE — BRIEF OP NOTE
Pre-Operative Diagnosis: Perforated sigmoid colon ([facility] [address] [K63.1]     Post-Operative Diagnosis: same as pre-op      Procedure Performed:   Diagnostic laparoscopy  Laparoscopic mobilization of sigmoid colon  Open debridement of sigmoid colon perforation  Primary repair of sigmoid perforation      Surgeon(s) and Role:     * Severiano Moder, MD - Primary     Paulette Edwards MD - Surgical Assistant    Assistant(s):        Surgical Findings: Sigmoid perforation in the mesentery, four colonic clips identified     Specimen: Colonic clips     Estimated Blood Loss: Blood Output: 30 mL (4/24/2023 10:06 PM)      Dictation Number: None, see op report    Vern Snellen, MD  4/24/2023  10:25 PM

## 2023-04-25 NOTE — ANESTHESIA PROCEDURE NOTES
Airway  Date/Time: 4/24/2023 7:54 PM  Urgency: elective      General Information and Staff    Patient location during procedure: OR  Anesthesiologist: Adriana Johnson MD  Performed: anesthesiologist   Performed by: Adriana Johnson MD  Authorized by: Adriana Johnson MD      Indications and Patient Condition  Indications for airway management: anesthesia  Sedation level: deep  Preoxygenated: yes  Patient position: sniffing  Mask difficulty assessment: 1 - vent by mask    Final Airway Details  Final airway type: endotracheal airway      Successful airway: ETT  Cuffed: yes   Successful intubation technique: direct laryngoscopy  Endotracheal tube insertion site: oral  Blade: [place]  Blade size: #3  ETT size (mm): 7.0    Cormack-Lehane Classification: grade I - full view of glottis  Placement verified by: chest auscultation and capnometry   Cuff volume (mL): 7  Measured from: lips  ETT to lips (cm): 22  Number of attempts at approach: 1    Additional Comments  atraumatic

## 2023-04-26 VITALS
BODY MASS INDEX: 21 KG/M2 | OXYGEN SATURATION: 98 % | SYSTOLIC BLOOD PRESSURE: 128 MMHG | WEIGHT: 123 LBS | HEART RATE: 67 BPM | DIASTOLIC BLOOD PRESSURE: 79 MMHG | RESPIRATION RATE: 18 BRPM | TEMPERATURE: 97 F

## 2023-04-26 PROBLEM — K91.89 OTHER POSTPROCEDURAL COMPLICATIONS AND DISORDERS OF DIGESTIVE SYSTEM: Status: ACTIVE | Noted: 2023-04-26

## 2023-04-26 PROBLEM — K63.1 COLON PERFORATION (HCC): Status: RESOLVED | Noted: 2023-04-24 | Resolved: 2023-04-26

## 2023-04-26 PROBLEM — K91.89 OTHER POSTPROCEDURAL COMPLICATIONS AND DISORDERS OF DIGESTIVE SYSTEM: Status: RESOLVED | Noted: 2023-04-24 | Resolved: 2023-04-26

## 2023-04-26 LAB
ANION GAP SERPL CALC-SCNC: 4 MMOL/L (ref 0–18)
BASOPHILS # BLD AUTO: 0.04 X10(3) UL (ref 0–0.2)
BASOPHILS NFR BLD AUTO: 0.4 %
BUN BLD-MCNC: 9 MG/DL (ref 7–18)
CALCIUM BLD-MCNC: 9.1 MG/DL (ref 8.5–10.1)
CHLORIDE SERPL-SCNC: 111 MMOL/L (ref 98–112)
CO2 SERPL-SCNC: 26 MMOL/L (ref 21–32)
CREAT BLD-MCNC: 0.57 MG/DL
EOSINOPHIL # BLD AUTO: 0.14 X10(3) UL (ref 0–0.7)
EOSINOPHIL NFR BLD AUTO: 1.3 %
ERYTHROCYTE [DISTWIDTH] IN BLOOD BY AUTOMATED COUNT: 12.1 %
GFR SERPLBLD BASED ON 1.73 SQ M-ARVRAT: 109 ML/MIN/1.73M2 (ref 60–?)
GLUCOSE BLD-MCNC: 106 MG/DL (ref 70–99)
HCT VFR BLD AUTO: 35.6 %
HGB BLD-MCNC: 12 G/DL
IMM GRANULOCYTES # BLD AUTO: 0.05 X10(3) UL (ref 0–1)
IMM GRANULOCYTES NFR BLD: 0.5 %
LYMPHOCYTES # BLD AUTO: 2.31 X10(3) UL (ref 1–4)
LYMPHOCYTES NFR BLD AUTO: 21.9 %
MAGNESIUM SERPL-MCNC: 1.9 MG/DL (ref 1.6–2.6)
MCH RBC QN AUTO: 30.2 PG (ref 26–34)
MCHC RBC AUTO-ENTMCNC: 33.7 G/DL (ref 31–37)
MCV RBC AUTO: 89.4 FL
MONOCYTES # BLD AUTO: 0.79 X10(3) UL (ref 0.1–1)
MONOCYTES NFR BLD AUTO: 7.5 %
NEUTROPHILS # BLD AUTO: 7.2 X10 (3) UL (ref 1.5–7.7)
NEUTROPHILS # BLD AUTO: 7.2 X10(3) UL (ref 1.5–7.7)
NEUTROPHILS NFR BLD AUTO: 68.4 %
OSMOLALITY SERPL CALC.SUM OF ELEC: 291 MOSM/KG (ref 275–295)
PLATELET # BLD AUTO: 247 10(3)UL (ref 150–450)
POTASSIUM SERPL-SCNC: 3.4 MMOL/L (ref 3.5–5.1)
RBC # BLD AUTO: 3.98 X10(6)UL
SODIUM SERPL-SCNC: 141 MMOL/L (ref 136–145)
WBC # BLD AUTO: 10.5 X10(3) UL (ref 4–11)

## 2023-04-26 PROCEDURE — 99239 HOSP IP/OBS DSCHRG MGMT >30: CPT | Performed by: INTERNAL MEDICINE

## 2023-04-26 RX ORDER — POTASSIUM CHLORIDE 20 MEQ/1
40 TABLET, EXTENDED RELEASE ORAL ONCE
Status: COMPLETED | OUTPATIENT
Start: 2023-04-26 | End: 2023-04-26

## 2023-04-26 RX ORDER — ACETAMINOPHEN 500 MG
500 TABLET ORAL EVERY 4 HOURS PRN
Refills: 0 | Status: SHIPPED | COMMUNITY
Start: 2023-04-26

## 2023-04-26 NOTE — PLAN OF CARE
Patient alert and oriented x4. VSS, on room air. Patient received scheduled Tylenol and Toradol for pain control. Patient complains of gas pain and the inability to fall asleep. [facility] paged. Orders for simethicone and melatonin. Plan of care discussed. Call light within reach.      Problem: Patient/Family Goals  Goal: Patient/Family Long Term Goal  Description: Patient's Long Term Goal: Discharge home     Interventions:  - advance diet   - ambulate as tolerated   - monitor drain output  - See additional Care Plan goals for specific interventions  Outcome: Progressing  Goal: Patient/Family Short Term Goal  Description: Patient's Short Term Goal: tolerate pain     Interventions:   - prn pain meds  - See additional Care Plan goals for specific interventions  Outcome: Progressing     Problem: PAIN - ADULT  Goal: Verbalizes/displays adequate comfort level or patient's stated pain goal  Description: INTERVENTIONS:  - Encourage pt to monitor pain and request assistance  - Assess pain using appropriate pain scale  - Administer analgesics based on type and severity of pain and evaluate response  - Implement non-pharmacological measures as appropriate and evaluate response  - Consider cultural and social influences on pain and pain management  - Manage/alleviate anxiety  - Utilize distraction and/or relaxation techniques  - Monitor for opioid side effects  - Notify MD/LIP if interventions unsuccessful or patient reports new pain  - Anticipate increased pain with activity and pre-medicate as appropriate  Outcome: Progressing     Problem: RISK FOR INFECTION - ADULT  Goal: Absence of fever/infection during anticipated neutropenic period  Description: INTERVENTIONS  - Monitor WBC  - Administer growth factors as ordered  - Implement neutropenic guidelines  Outcome: Progressing     Problem: SAFETY ADULT - FALL  Goal: Free from fall injury  Description: INTERVENTIONS:  - Assess pt frequently for physical needs  - Identify cognitive and physical deficits and behaviors that affect risk of falls.   - [place] fall precautions as indicated by assessment.  - Educate pt/family on patient safety including physical limitations  - Instruct pt to call for assistance with activity based on assessment  - Modify environment to reduce risk of injury  - Provide assistive devices as appropriate  - Consider OT/PT consult to assist with strengthening/mobility  - Encourage toileting schedule  Outcome: Progressing     Problem: DISCHARGE PLANNING  Goal: Discharge to home or other facility with appropriate resources  Description: INTERVENTIONS:  - Identify barriers to discharge w/pt and caregiver  - Include patient/family/discharge partner in discharge planning  - Arrange for needed discharge resources and transportation as appropriate  - Identify discharge learning needs (meds, wound care, etc)  - Arrange for interpreters to assist at discharge as needed  - Consider post-discharge preferences of patient/family/discharge partner  - Complete POLST form as appropriate  - Assess patient's ability to be responsible for managing their own health  - Refer to Case Management Department for coordinating discharge planning if the patient needs post-hospital services based on physician/LIP order or complex needs related to functional status, cognitive ability or social support system  Outcome: Progressing     Problem: Altered Communication/Language Barrier  Goal: Patient/Family is able to understand and participate in their care  Description: Interventions:  - Assess communication ability and preferred communication style  - Implement communication aides and strategies  - Use visual cues when possible  - Listen attentively, be patient, do not interrupt  - Minimize distractions  - Allow time for understanding and response  - Establish method for patient to ask for assistance (call light)  - Provide an  as needed  - Communicate barriers and strategies to overcome with those who interact with patient  Outcome: Progressing     Problem: GASTROINTESTINAL - ADULT  Goal: Minimal or absence of nausea and vomiting  Description: INTERVENTIONS:  - Maintain adequate hydration with IV or PO as ordered and tolerated  - Nasogastric tube to low intermittent suction as ordered  - Evaluate effectiveness of ordered antiemetic medications  - Provide nonpharmacologic comfort measures as appropriate  - Advance diet as tolerated, if ordered  - Obtain nutritional consult as needed  - Evaluate fluid balance  Outcome: Progressing  Goal: Maintains or returns to baseline bowel function  Description: INTERVENTIONS:  - Assess bowel function  - Maintain adequate hydration with IV or PO as ordered and tolerated  - Evaluate effectiveness of GI medications  - Encourage mobilization and activity  - Obtain nutritional consult as needed  - Establish a toileting routine/schedule  - Consider collaborating with pharmacy to review patient's medication profile  Outcome: Progressing  Goal: Maintains adequate nutritional intake (undernourished)  Description: INTERVENTIONS:  - Monitor percentage of each meal consumed  - Identify factors contributing to decreased intake, treat as appropriate  - Assist with meals as needed  - Monitor I&O, WT and lab values  - Obtain nutritional consult as needed  - Optimize oral hygiene and moisture  - Encourage food from home; allow for food preferences  - Enhance eating environment  Outcome: Progressing  Goal: Achieves appropriate nutritional intake (bariatric)  Description: INTERVENTIONS:  - Monitor for over-consumption  - Identify factors contributing to increased intake, treat as appropriate  - Monitor I&O, WT and lab values  - Obtain nutritional consult as needed  - Evaluate psychosocial factors contributing to over-consumption  Outcome: Progressing

## 2023-04-26 NOTE — DISCHARGE INSTRUCTIONS
Home Care Instructions  LAPAROSCOPIC/ROBOTIC SURGERY      MEDICATIONS   You should anticipate moderate to severe pain for the first few days. You may use ibuprofen ( Motrin ) 600 mg 3 times a day with Tylenol 1000 mg 3 times a day     DIET   Your diet should be low fiber for 4 weeks    ACTIVITY   You should not drive for the first 3 to 5 days or if you are still requiring prescription pain medication. No lifting greater than 10 to 15 pounds for 3 weeks   Avoid strenuous activity such as running and physical workouts for 3 weeks. Normal daily activities are fine, such as climbing stairs   You may shower after 24 hours. The incisions can get wet but should not be under water in a bath. You may return to work as instructed by your surgeon. CARE OF SURGICAL SITE   Pain, colorful bruising and slight swelling at the incision sites is usually normal   If you experience fever, chills, inability to urinate, nausea with vomiting, severe diarrhea  or severe, cramping abdominal pain please contact your surgeon    APPOINTMENT   Call the office when you arrive home after surgery and make an appointment to see your surgeon in one week. Should you develop any problems prior to your scheduled appointment, do not hesitate  to call the office.

## 2023-04-26 NOTE — PROGRESS NOTES
Pt reports moderate gas and back pain this am. Tolerating clears. Advanced to full liquids. Denies nausea. IVSL. Abdominal incisions with skin glue CDI. JOSÉ MIGUEL to right abdomen with serosanguinous drainage in bulb. Up with standby assist. Voiding without difficulty. Plan of care reviewed. All questions answered. 1600 JOSÉ MIGUEL removed. AVS provided and reviewed in detain with pt and her  at the bedside.

## 2023-05-02 ENCOUNTER — OFFICE VISIT (OUTPATIENT)
Facility: LOCATION | Age: 53
End: 2023-05-02

## 2023-05-02 ENCOUNTER — TELEPHONE (OUTPATIENT)
Facility: LOCATION | Age: 53
End: 2023-05-02

## 2023-05-02 VITALS — HEART RATE: 106 BPM | TEMPERATURE: 98 F

## 2023-05-02 DIAGNOSIS — K63.1 COLON PERFORATION (HCC): Primary | ICD-10-CM

## 2023-05-02 DIAGNOSIS — Z98.890 S/P EXPLORATORY LAPAROTOMY: ICD-10-CM

## 2023-05-02 PROCEDURE — 99024 POSTOP FOLLOW-UP VISIT: CPT | Performed by: STUDENT IN AN ORGANIZED HEALTH CARE EDUCATION/TRAINING PROGRAM

## 2023-05-03 NOTE — TELEPHONE ENCOUNTER
received pts disab  And RTW forms and logged for processing. valid auth and AKIN received also.  There are 2 emails use 2nd email due to all forms are in that email

## 2023-05-11 NOTE — TELEPHONE ENCOUNTER
Pt called asking status on pending disab forms. Informed pt of our turnaround time but we should be getting to her forms soon.  Pt's first day off work 4/24/23  RTW6/6/23

## 2023-05-15 NOTE — TELEPHONE ENCOUNTER
Dr. Anna Marie Hall,      Please sign off on form if you agree to: 2 forms to sign off on please. Continous FMLA due to pt's recent Sx. Start: 4/24/23---RTW: 6/6/23  (Please place your signature on the first page only)    -Within your inbasket, Highlight the patient and hit \"Chart\" button. -In Chart Review, w/in the Encounter tab - click 1 time on the Telephone call encounter for 5/2/23 Scroll down the telephone encounter.  -Click \"scan on\" blue Hyperlink under \"Media\" heading for RTW Dr. Anna Marie Hall 5/15/23, Disab Dr. Anna Marie Hall 5/15/23 w/in the telephone enc.  -Click on Acknowledge button at the bottom right corner and left-click onto image, signature stamp appears and drag signature to Provider signature line. Stamp will turn blue. Close window.      Thank you,  [facility]

## 2023-05-16 NOTE — TELEPHONE ENCOUNTER
Forms completed and faxed to [facility] at [phone], confirmation rcv'd. Reciclata message sent to pt.

## 2023-05-30 ENCOUNTER — OFFICE VISIT (OUTPATIENT)
Facility: LOCATION | Age: 53
End: 2023-05-30

## 2023-05-30 VITALS — TEMPERATURE: 99 F

## 2023-05-30 DIAGNOSIS — K63.1 BOWEL PERFORATION (HCC): Primary | ICD-10-CM

## 2023-05-30 DIAGNOSIS — K63.5 SERRATED POLYP OF COLON: ICD-10-CM

## 2023-05-30 PROCEDURE — 99024 POSTOP FOLLOW-UP VISIT: CPT | Performed by: STUDENT IN AN ORGANIZED HEALTH CARE EDUCATION/TRAINING PROGRAM

## 2023-05-31 PROBLEM — K63.5 SERRATED POLYP OF COLON: Status: ACTIVE | Noted: 2023-05-31

## 2023-07-13 ENCOUNTER — GENETICS ENCOUNTER (OUTPATIENT)
Dept: GENETICS | Facility: HOSPITAL | Age: 53
End: 2023-07-13
Attending: GENETIC COUNSELOR, MS
Payer: COMMERCIAL

## 2023-07-13 ENCOUNTER — NURSE ONLY (OUTPATIENT)
Dept: HEMATOLOGY/ONCOLOGY | Facility: HOSPITAL | Age: 53
End: 2023-07-13
Attending: GENETIC COUNSELOR, MS
Payer: COMMERCIAL

## 2023-07-13 DIAGNOSIS — Z80.0 FAMILY HISTORY OF PANCREATIC CANCER: ICD-10-CM

## 2023-07-13 DIAGNOSIS — Z86.010 PERSONAL HISTORY OF COLONIC POLYPS: Primary | ICD-10-CM

## 2023-07-13 PROCEDURE — 36415 COLL VENOUS BLD VENIPUNCTURE: CPT

## 2023-07-13 PROCEDURE — 96040 HC GENETIC COUNSELING EA 30 MIN: CPT | Performed by: GENETIC COUNSELOR, MS

## 2023-08-01 ENCOUNTER — GENETICS ENCOUNTER (OUTPATIENT)
Dept: HEMATOLOGY/ONCOLOGY | Facility: HOSPITAL | Age: 53
End: 2023-08-01

## 2023-08-01 NOTE — PROGRESS NOTES
Referring Provider:        Kaz Marte MD     Additional Provider:     MD Ofelia Harris APRN     Reason for Referral:  Elysia Landrum had genetic testing performed on 013/23 because of a personal and family history of sessile serrated polyps, a maternal family history of cancer, and a limited paternal family history. Genetic Testing Result:  No known pathogenic variants were found in 56 genes including: APC*, MCKENZIE*, AXIN2, BARD1, BLM, BMPR1A, BRCA1, BRCA2, BRIP1, BUB1B, CDH1, CDK4, CDKN2A (p14ARF), CDKN2A ([id number]), CEP57*, CHEK2, CTNNA1, DICER1*, ENG*, EPCAM*, FLCN, GALNT12, GREM1*, HOXB13, KIT, MEN1*, MLH1*, MLH3*, MSH2*, MSH3*, MSH6*, MUTYH, NBN, NF1*, NTHL1, PALB2, PDGFRA, PMS2*, POLD1*, POLE, PTEN*, RAD50, RAD51C, RAD51D, RNF43, RPS20, SDHA*, SDHB, SDHC*, SDHD, SMAD4, SMARCA4, STK11, TP53, TSC1*, TSC2, VHL. A variant of uncertain significance, BRCA2 c.5702A>T ([id number]), was identified. Please refer to the report from [facility] ([id number]) for additional testing information. These results were discussed with Ms. Rock by phone on 08/01/23. Summary and Plan:  The etiology of Ms. Haq colorectal polyps remains unexplained. The limitations of the testing include the chance that a pathogenic variant in a gene other than those included in this panel might be the cause of colon polyps in Ms. Rock. At this time, no alteration in Ms. Rock's medical recommendations should be made based on the BRCA2 variant identified in Ms. Rock since it is currently undetermined if it is associated with an increased risk for cancer or if it is a benign polymorphism; notably, [facility] currently classifies this variant as favor polymorphism/likely benign (phone conversation with Sly Kwan, a genetic counselor at [facility] on 08/01/23).   As more families are tested and/or functional studies are performed, it is possible that this variant will be reclassified in the future. Ms. Diana Osorio should contact me on an annual basis to see if the VUS has been reclassified and to learn if there have been any updates in genetic testing that would apply to her. In the meantime, Ms. Rock and her relatives should speak with their physicians to discuss recommended medical management according to their personal and family history. Ms. Ciarra Mei lifetime risk for breast cancer is estimated to be 8.2% based on Tyrer-Cuzick model, version 8.        Cc:  Tor Brine

## 2024-02-12 NOTE — ED QUICK NOTES
Patient ambulatory to restroom, no complaints. Tolerated well.  Aware of POC no weight-bearing restrictions

## 2024-04-10 ENCOUNTER — OFFICE VISIT (OUTPATIENT)
Facility: LOCATION | Age: 54
End: 2024-04-10
Payer: COMMERCIAL

## 2024-04-10 VITALS — HEART RATE: 84 BPM | TEMPERATURE: 97 F

## 2024-04-10 DIAGNOSIS — K63.1 BOWEL PERFORATION (HCC): Primary | ICD-10-CM

## 2024-04-10 DIAGNOSIS — K63.5 SERRATED POLYP OF COLON: ICD-10-CM

## 2024-04-10 DIAGNOSIS — Z83.719 FAMILY HISTORY OF COLONIC POLYPS: ICD-10-CM

## 2024-04-10 PROCEDURE — 99213 OFFICE O/P EST LOW 20 MIN: CPT | Performed by: STUDENT IN AN ORGANIZED HEALTH CARE EDUCATION/TRAINING PROGRAM

## 2024-04-10 RX ORDER — SOD SULF/POT CHLORIDE/MAG SULF 1.479 G
TABLET ORAL
Qty: 24 TABLET | Refills: 0 | Status: SHIPPED | OUTPATIENT
Start: 2024-04-10

## 2024-04-10 NOTE — PROGRESS NOTES
Patient ID: Yaa Rock is a 53 year old female.    Chief Complaint   Patient presents with    [facility]     Est - f/u bowel perforation, colonoscopy, Maternal Colon cancer, No symptoms,        HPI: Toña Rock is a 53 year old female presents to clinic for follow-up.  Since last clinic appointment, patient has been doing well.  She denies any new issues.  She presents today for repeat colonoscopy.    Workup: None      Past Medical History  Past Medical History:    Food intolerance    Heartburn    Hemorrhoids    High cholesterol    no meds    Osteoarthritis    knees    Pain in joints    Uncomfortable fullness after meals    Wears glasses       Past Surgical History  Past Surgical History:   Procedure Laterality Date    Colonoscopy      perforated bowel 4/2023    Other surgical history      uterine ablation    Other surgical history      2023 repair perforated bowel       Medications  No current outpatient medications on file.       Allergies  Allergies   Allergen Reactions    Penicillins ITCHING       Social History  History   Smoking Status    Former    Types: Cigarettes   Smokeless Tobacco    Never     History   Alcohol Use    1.0 standard drink of alcohol/week    1 Glasses of wine per week     Comment: rare     History   Drug Use Unknown       Family History  Family History   Problem Relation Age of Onset    Colon Cancer Mother     Hypertension Mother     Diabetes Maternal Grandfather        Review of Systems  Review of Systems    Exam  Vitals:    04/10/24 0829   Pulse: 84   Temp: 97.2 °F (36.2 °C)     Physical Exam      Assessment/Plan  Assessment   Problem List Items Addressed This Visit          HCC Problems    Bowel perforation (HCC) - Primary    Relevant Medications    Sodium Sulfate-Mag Sulfate-KCl (SUTAB) 1604-555-738 MG Oral Tab       Gastrointestinal and Abdominal    Family history of colonic polyps    Relevant Medications    Sodium Sulfate-Mag Sulfate-KCl (SUTAB) 0463-155-907 MG Oral Tab     Serrated polyp of colon    Relevant Medications    Sodium Sulfate-Mag Sulfate-KCl (SUTAB) 9927-322-907 MG Oral Tab       Toña Rock is a 53 year old female with possible family history of colon cancer in her mom, family history of colon polyps, personal history of serrated colon polyp, status post perforation after colonoscopy requiring laparotomy and repair    Overall, patient is doing well  She presents today for repeat colonoscopy  Her last colonoscopy, a serrated colon polyp was found within the sigmoid colon  This was complicated by bowel perforation  It was recommended that she undergo repeat colonoscopy within a year  Since surgery, patient denies any issues  We will plan for colonoscopy, possible biopsy  Procedure explained to the patient  Risks including bleeding, pain, infection, perforation, and need for further intervention all discussed  Bowel prep previously discussed with the patient via office staff  All questions were answered    Patient can call my office for any questions or concerns       Sumi Armstrong MD  General Surgery  [facility]     CC:  CHRISTOPHER Mays

## 2024-04-11 ENCOUNTER — TELEPHONE (OUTPATIENT)
Facility: LOCATION | Age: 54
End: 2024-04-11

## 2024-04-11 DIAGNOSIS — Z86.010 HISTORY OF COLON POLYPS: Primary | ICD-10-CM

## 2024-04-17 ENCOUNTER — TELEPHONE (OUTPATIENT)
Facility: LOCATION | Age: 54
End: 2024-04-17

## 2024-04-17 NOTE — TELEPHONE ENCOUNTER
Physician Name: Dr. VICKEY CEBALLOS Contact: liseth  Physician Address: [address] [place], [place] [id number] Phone Number: [phone]   Fax Number: [phone]  Patient Name: LESLEY MCMANUS Patient Id: [id number]  Insurance Carrier: [facility]  Site Name: [facility] Site ID: [id number]  Site Address: [address] [place] [id number]      Primary Diagnosis Code: Z86.010 Description: Personal history of colonic polyps  Secondary Diagnosis Code: Z80.0 Description: Family history of malignant neoplasm of digestive organs  CPT Code GECOL Description: Colonoscopy  Authorization Number: [id number]  Case Number: [id number]  Review Date: 4/17/2024 1:38:12 PM  Expiration Date: 10/14/2024  Status: Your case has been Approved.

## 2024-04-30 ENCOUNTER — HOSPITAL ENCOUNTER (OUTPATIENT)
Facility: HOSPITAL | Age: 54
Setting detail: HOSPITAL OUTPATIENT SURGERY
Discharge: HOME | End: 2024-04-30
Attending: STUDENT IN AN ORGANIZED HEALTH CARE EDUCATION/TRAINING PROGRAM | Admitting: STUDENT IN AN ORGANIZED HEALTH CARE EDUCATION/TRAINING PROGRAM
Payer: COMMERCIAL

## 2024-04-30 ENCOUNTER — ANESTHESIA (OUTPATIENT)
Dept: ENDOSCOPY | Facility: HOSPITAL | Age: 54
End: 2024-04-30
Payer: COMMERCIAL

## 2024-04-30 ENCOUNTER — ANESTHESIA EVENT (OUTPATIENT)
Dept: ENDOSCOPY | Facility: HOSPITAL | Age: 54
End: 2024-04-30
Payer: COMMERCIAL

## 2024-04-30 VITALS
DIASTOLIC BLOOD PRESSURE: 79 MMHG | OXYGEN SATURATION: 100 % | HEIGHT: 64 IN | BODY MASS INDEX: 21 KG/M2 | RESPIRATION RATE: 16 BRPM | HEART RATE: 74 BPM | WEIGHT: 123 LBS | SYSTOLIC BLOOD PRESSURE: 121 MMHG | TEMPERATURE: 98 F

## 2024-04-30 DIAGNOSIS — Z86.010 HISTORY OF COLON POLYPS: ICD-10-CM

## 2024-04-30 PROCEDURE — 88305 TISSUE EXAM BY PATHOLOGIST: CPT | Performed by: STUDENT IN AN ORGANIZED HEALTH CARE EDUCATION/TRAINING PROGRAM

## 2024-04-30 PROCEDURE — 0DBP8ZX EXCISION OF RECTUM, VIA NATURAL OR ARTIFICIAL OPENING ENDOSCOPIC, DIAGNOSTIC: ICD-10-PCS | Performed by: STUDENT IN AN ORGANIZED HEALTH CARE EDUCATION/TRAINING PROGRAM

## 2024-04-30 RX ORDER — PHENYLEPHRINE HCL 10 MG/ML
VIAL (ML) INJECTION AS NEEDED
Status: DISCONTINUED | OUTPATIENT
Start: 2024-04-30 | End: 2024-04-30 | Stop reason: SURG

## 2024-04-30 RX ORDER — NALOXONE HYDROCHLORIDE 0.4 MG/ML
80 INJECTION, SOLUTION INTRAMUSCULAR; INTRAVENOUS; SUBCUTANEOUS AS NEEDED
Status: DISCONTINUED | OUTPATIENT
Start: 2024-04-30 | End: 2024-04-30

## 2024-04-30 RX ORDER — SODIUM CHLORIDE, SODIUM LACTATE, POTASSIUM CHLORIDE, CALCIUM CHLORIDE 600; 310; 30; 20 MG/100ML; MG/100ML; MG/100ML; MG/100ML
INJECTION, SOLUTION INTRAVENOUS CONTINUOUS
Status: DISCONTINUED | OUTPATIENT
Start: 2024-04-30 | End: 2024-04-30

## 2024-04-30 RX ORDER — LIDOCAINE HYDROCHLORIDE 10 MG/ML
INJECTION, SOLUTION EPIDURAL; INFILTRATION; INTRACAUDAL; PERINEURAL AS NEEDED
Status: DISCONTINUED | OUTPATIENT
Start: 2024-04-30 | End: 2024-04-30 | Stop reason: SURG

## 2024-04-30 RX ADMIN — SODIUM CHLORIDE, SODIUM LACTATE, POTASSIUM CHLORIDE, CALCIUM CHLORIDE: 600; 310; 30; 20 INJECTION, SOLUTION INTRAVENOUS at 08:33:00

## 2024-04-30 RX ADMIN — PHENYLEPHRINE HCL 100 MCG: 10 MG/ML VIAL (ML) INJECTION at 09:32:00

## 2024-04-30 RX ADMIN — LIDOCAINE HYDROCHLORIDE 25 MG: 10 INJECTION, SOLUTION EPIDURAL; INFILTRATION; INTRACAUDAL; PERINEURAL at 08:33:00

## 2024-04-30 RX ADMIN — SODIUM CHLORIDE, SODIUM LACTATE, POTASSIUM CHLORIDE, CALCIUM CHLORIDE: 600; 310; 30; 20 INJECTION, SOLUTION INTRAVENOUS at 09:39:00

## 2024-04-30 RX ADMIN — PHENYLEPHRINE HCL 100 MCG: 10 MG/ML VIAL (ML) INJECTION at 09:19:00

## 2024-04-30 NOTE — DISCHARGE INSTRUCTIONS
Home Care Instructions for Colonoscopy With Sedation    Diet:  - Resume your regular diet as tolerated unless otherwise instructed.  - start with light meals to minimize bloating.  - Do not drink alcohol today.    Medication:  - If you have questions about resuming your normal medications, please contact your Primary Care Physician.    Activities:  - Take it easy today. Do not return to work today.  - Do not drive today.  - Do not operate any machinery today (including kitchen equipment).    Colonoscopy:  - You may notice some rectal \"spotting\" (a little blood on the toilet tissue) for a day or two after the exam. This is normal.  - If you experience any rectal bleeding (not spotting), persistent tenderness or sharp severe abdominal pains, oral temperature over 100 degrees Farenheit, light-headedness or dizziness, or any other problems, contact your doctor.      **If unable to reach your doctor, please go to the [facility] Emergency Room**    - Your referring physician will receive a full report of your examination.  - If you do not hear from your doctor's office within two weeks of your biopsy, please call them for your results.    Additional Comments/Instructions (if applicable):

## 2024-04-30 NOTE — OPERATIVE REPORT
[facility]  Operative Note    Toña Rock Location: OR   CSN [id number] MRN [id number]    1970 Age 53 year old   Admission Date 2024 Operation Date 2024   Attending Physician Sumi Armstrong MD Operating Physician Sumi Armstrong MD   PCP CHRISTOPHER Mays          Patient Name: Toña Rock    Preoperative Diagnosis: History of colon polyps [Z86.010]    Postoperative Diagnosis:   Personal history of colon polyps  Family history of colon cancer  Family history of colon polyps  Rectal polyp  Hemorrhoids    Primary Surgeon: Sumi Armstrong MD    Anesthesia: MAC    Procedures: Colonoscopy to the cecum with cold forceps polypectomy    Specimen:   Rectal polyp    Estimated Blood Loss: 1    Complications: None immediate    Condition: Good    Indications for Surgery:   Toña Rock is a 53 year old female who has a family history of colon cancer as well as a family history of colon polyps.  Patient underwent colonoscopy 6 to 8 months ago where a serrated polyp was found.  She also had multiple large polyps measuring 2 cm that were removed.  Patient did have a perforation at this time and had to undergo operative exploration for repair.  She presents today for repeat colonoscopy.    Surgical Findings:   The quality of the bowel prep was excellent.  Hadley bowel prep scale was 3 for each segment.    Description of Procedure:   The Patient was taken to the endoscopy suite and positioned in the left lateral decubitus position with knees flexed. Monitored anesthesia care was administered. A time-out was performed.    The perineum and perianal skin were examined.  Patient had external hemorrhoids.  A digital rectal examination was performed.  No hard nodules or abnormalities were palpated. A well-lubricated adult colonoscope was then inserted and carefully navigated to the cecum. CO2 insufflation was used throughout the procedure. Advancement was accomplished easily. The appendiceal orifice  and ileocecal valve were identified and photographed. The terminal ileum was not intubated. The scope was then withdrawn as the mucosa was circumferentially examined.  There were no large polyps or masses or AVMs identified.  Patient did not have any diverticulosis.  She had prior polypectomies done at her previous colonoscopy, scar tissue from these were not identified.  Patient did have what looked like a hyperplastic polyp in the rectum.  Polypectomy was performed to confirm diagnosis.  The scope was not retroflexed in the rectum but there was good visualization of the anal vault, identifying just internal hemorrhoids.  The scope was removed, terminating the procedure.    Anesthesia was terminated and the patient transported to the recovery unit in good condition. The patient tolerated the procedure well without apparent intraoperative complication.    Follow up:   Patient will need next colonoscopy pending pathology results.       Sumi Armstrong MD  4/30/2024  9:54 AM

## 2024-04-30 NOTE — H&P
The above referenced H&P was reviewed by Sumi Armstrong MD on 4/30/2024, the patient was examined and no significant changes have occurred in the patient's condition since the H&P was performed.  Risks, benefits, alternative treatments and consequences of no treatment were discussed.  We will proceed with procedure as planned.      Sumi Armstrong MD  4/30/2024  8:30 AM        Patient ID: Yaa Rock is a 53 year old female.    Chief Complaint   Patient presents with    [facility]     Est - f/u bowel perforation, colonoscopy, Maternal Colon cancer, No symptoms,        HPI: Toña Rock is a 53 year old female presents to clinic for follow-up.  Since last clinic appointment, patient has been doing well.  She denies any new issues.  She presents today for repeat colonoscopy.    Workup: None      Past Medical History  Past Medical History:    Food intolerance    Heartburn    Hemorrhoids    High cholesterol    no meds    Osteoarthritis    knees    Pain in joints    Uncomfortable fullness after meals    Wears glasses       Past Surgical History  Past Surgical History:   Procedure Laterality Date    Colonoscopy      perforated bowel 4/2023    Other surgical history      uterine ablation    Other surgical history      2023 repair perforated bowel       Medications  No current outpatient medications on file.       Allergies  Allergies   Allergen Reactions    Penicillins ITCHING       Social History  History   Smoking Status    Former    Types: Cigarettes   Smokeless Tobacco    Never     History   Alcohol Use    1.0 standard drink of alcohol/week    1 Glasses of wine per week     Comment: rare     History   Drug Use Unknown       Family History  Family History   Problem Relation Age of Onset    Colon Cancer Mother     Hypertension Mother     Diabetes Maternal Grandfather        Review of Systems  Review of Systems    Exam  Vitals:    04/10/24 0829   Pulse: 84   Temp: 97.2 °F (36.2 °C)     Physical  Exam      Assessment/Plan  Assessment   Problem List Items Addressed This Visit          HCC Problems    Bowel perforation (HCC) - Primary    Relevant Medications    Sodium Sulfate-Mag Sulfate-KCl (SUTAB) 1178-807-213 MG Oral Tab       Gastrointestinal and Abdominal    Family history of colonic polyps    Relevant Medications    Sodium Sulfate-Mag Sulfate-KCl (SUTAB) 0095-115-239 MG Oral Tab    Serrated polyp of colon    Relevant Medications    Sodium Sulfate-Mag Sulfate-KCl (SUTAB) 7615-625-373 MG Oral Tab       Toña Rock is a 53 year old female with possible family history of colon cancer in her mom, family history of colon polyps, personal history of serrated colon polyp, status post perforation after colonoscopy requiring laparotomy and repair    Overall, patient is doing well  She presents today for repeat colonoscopy  Her last colonoscopy, a serrated colon polyp was found within the sigmoid colon  This was complicated by bowel perforation  It was recommended that she undergo repeat colonoscopy within a year  Since surgery, patient denies any issues  We will plan for colonoscopy, possible biopsy  Procedure explained to the patient  Risks including bleeding, pain, infection, perforation, and need for further intervention all discussed  Bowel prep previously discussed with the patient via office staff  All questions were answered    Patient can call my office for any questions or concerns       Sumi Armstrong MD  General Surgery  [facility]     CC:  CHRISTOPHER Mays

## 2024-04-30 NOTE — ANESTHESIA PREPROCEDURE EVALUATION
PRE-OP EVALUATION    Patient Name: Toña Rock    Admit Diagnosis: History of colon polyps [Z86.010]    Pre-op Diagnosis: History of colon polyps [Z86.010]    COLONOSCOPY    Anesthesia Procedure: COLONOSCOPY    Surgeons and Role:     * Sumi Armstrong MD - Primary    Pre-op vitals reviewed.  Temp: 98.2 °F (36.8 °C)  Pulse: 77  Resp: 18  BP: 136/89  SpO2: 100 %  Body mass index is 21.11 kg/m².    Current medications reviewed.  Hospital Medications:   lactated ringers infusion   Intravenous Continuous       Outpatient Medications:     Medications Prior to Admission   Medication Sig Dispense Refill Last Dose    Sodium Sulfate-Mag Sulfate-KCl (SUTAB) 6837-900-716 MG Oral Tab Starting at 4PM the night before your procedure open one bottle and take all 12 tablets with 16 ounces of water within 15-20 minutes. At 9PM open the second bottle and take all 12 tablets with another 16 ounces of water. 24 tablet 0 4/30/2024    acetaminophen 500 MG Oral Tab Take 1 tablet (500 mg total) by mouth every 4 (four) hours as needed for Fever (equal to or greater than 100.4).  0     Multiple Vitamins-Minerals (MULTIVITAMIN ADULT OR) Take 1 tablet by mouth daily.          Allergies: Penicillins      Anesthesia Evaluation    Patient summary reviewed.    Anesthetic Complications  (-) history of anesthetic complications         GI/Hepatic/Renal      (-) GERD                           Cardiovascular        Exercise tolerance: good     MET: >4    (-) obesity  (-) hypertension     (-) CAD                  (-) angina     (-) POSEY         Endo/Other      (-) diabetes                            Pulmonary      (-) asthma  (-) COPD       (-) shortness of breath     (-) sleep apnea       Neuro/Psych                              Patient Active Problem List:     Polyarthritis     Sicca, unspecified type (HCC)     Special screening for malignant neoplasm of colon     Family history of colon cancer     Family history of colonic polyps     Benign  neoplasm of cecum     Benign neoplasm of descending colon     Bowel perforation (HCC)     Drug rash     Hyperlipidemia     Other postprocedural complications and disorders of digestive system     S/P exploratory laparotomy     Serrated polyp of colon            Past Surgical History:   Procedure Laterality Date    Colonoscopy      perforated bowel 2023    Other surgical history      uterine ablation    Other surgical history       repair perforated bowel     Social History     Socioeconomic History    Marital status:    Tobacco Use    Smoking status: Former     Current packs/day: 0.00     Types: Cigarettes     Quit date:      Years since quittin.3    Smokeless tobacco: Never   Vaping Use    Vaping status: Some Days    Substances: Nicotine   Substance and Sexual Activity    Alcohol use: Yes     Alcohol/week: 1.0 standard drink of alcohol     Types: 1 Glasses of wine per week     Comment: rare    Drug use: Never     History   Drug Use Unknown     Available pre-op labs reviewed.               Airway      Mallampati: I  Mouth opening: >3 FB  TM distance: 4 - 6 cm  Neck ROM: full Cardiovascular    Cardiovascular exam normal.  Rhythm: regular  Rate: normal     Dental  Comment: Patient denies any loose/missing/cracked teeth. No gross abnormalities or loose teeth noted on exam.      Dentition appears grossly intact         Pulmonary    Pulmonary exam normal.                 Other findings              ASA: 2   Plan: MAC  NPO status verified and patient meets guidelines.    Post-procedure pain management plan discussed with surgeon and patient.    Comment:     A detailed discussion about the anesthetic plan was held with Toña Rock in the preoperative area. Benefits and risks of MAC anesthesia were discussed, including intraoperative awareness/recall, PONV, reasonable expectations of post-operative pain/discomfort, aspiration, conversion to general anesthesia, dental injury, pressure/nerve injuries  from positioning, and other serious but rare complications (life-threatening cardiopulmonary events). All questions were answered appropriately and patient demonstrated understanding of realistic expectations and risks of undergoing anesthesia. Toña Rock consents to receiving anesthesia and wishes to proceed.  Plan/risks discussed with: patient                Present on Admission:  **None**

## 2024-04-30 NOTE — ANESTHESIA POSTPROCEDURE EVALUATION
[facility]    Toña Rock Patient Status:  Hospital Outpatient Surgery   Age/Gender 53 year old female MRN [id number]   Location [facility] Attending Sumi Armstrong MD   Hosp Day # 0 PCP CHRISTOPHER Mays       Anesthesia Post-op Note    COLONOSCOPY with forcep polypectomy    Procedure Summary       Date: 04/30/24 Room / Location:  ENDOSCOPY 04 /  ENDOSCOPY    Anesthesia Start: 0830 Anesthesia Stop:     Procedure: COLONOSCOPY with forcep polypectomy Diagnosis:       History of colon polyps      (Hemorrhoids, polyp)    Surgeons: Sumi Armstrong MD Anesthesiologist: Jose Guadalupe Guzmán MD    Anesthesia Type: MAC ASA Status: 2            Anesthesia Type: MAC    Vitals Value Taken Time   BP 99/69 04/30/24 0952   Temp   04/30/24 0953   Pulse 79 04/30/24 0952   Resp 16 04/30/24 0941   SpO2 100 % 04/30/24 0952   Vitals shown include unfiled device data.    Patient Location: Endoscopy    Anesthesia Type: MAC    Airway Patency: patent    Postop Pain Control: adequate    Mental Status: preanesthetic baseline    Nausea/Vomiting: none    Cardiopulmonary/Hydration status: stable euvolemic    Complications: no apparent anesthesia related complications    Postop vital signs: stable    Dental Exam: Unchanged from Preop    Patient to be discharged home when criteria met.

## 2024-05-06 ENCOUNTER — TELEPHONE (OUTPATIENT)
Facility: LOCATION | Age: 54
End: 2024-05-06

## (undated) DEVICE — Device

## (undated) DEVICE — GIJAW SINGLE-USE BIOPSY FORCEPS WITH NEEDLE: Brand: GIJAW

## (undated) DEVICE — E-Z BUTTON SWITCH PENCIL

## (undated) DEVICE — APPLICATOR CHLORAPREP 26ML

## (undated) DEVICE — 1200CC GUARDIAN II: Brand: GUARDIAN

## (undated) DEVICE — LAPAROTOMY SPONGE - RF AND X-RAY DETECTABLE PRE-WASHED: Brand: SITUATE

## (undated) DEVICE — BAG URINE 4 LITER 600909

## (undated) DEVICE — EVACUATOR RELIAVAC 100CC

## (undated) DEVICE — 3M™ TEGADERM™ TRANSPARENT FILM DRESSING, 1626W, 4 IN X 4-3/4 IN (10 CM X 12 CM), 50 EACH/CARTON, 4 CARTON/CASE: Brand: 3M™ TEGADERM™

## (undated) DEVICE — SOLUTION  .9 3000ML

## (undated) DEVICE — SUPER ATRAUMATIC MODIFIED GRASPER TIP, DISPOSABLE: Brand: RENEW

## (undated) DEVICE — #15 STERILE STAINLESS BLADE: Brand: STERILE STAINLESS BLADES

## (undated) DEVICE — 3M™ RED DOT™ MONITORING ELECTRODE WITH FOAM TAPE AND STICKY GEL 2570-3, 3/BAG, 200/CASE, 54/PLT: Brand: RED DOT™

## (undated) DEVICE — MEDI-VAC NON-CONDUCTIVE SUCTION TUBING: Brand: CARDINAL HEALTH

## (undated) DEVICE — DRAIN CHANNEL 19FR BLAKE

## (undated) DEVICE — PENCIL TELESCOPE MEGADYNE SE

## (undated) DEVICE — LIGHT HANDLE

## (undated) DEVICE — TRAY SURESTEP 16 LUB DRAINAGE

## (undated) DEVICE — HUNTER GASPER TIP, DISPOSABLE: Brand: RENEW

## (undated) DEVICE — CAUTERY TIP BLADE EXTENSION

## (undated) DEVICE — VIOLET BRAIDED (POLYGLACTIN 910), SYNTHETIC ABSORBABLE SUTURE: Brand: COATED VICRYL

## (undated) DEVICE — ENDOPATH ULTRA VERESS INSUFFLATION NEEDLES WITH LUER LOCK CONNECTORS: Brand: ENDOPATH

## (undated) DEVICE — SOLUTION ANTIFOG W/ SPONGE

## (undated) DEVICE — SIGMOIDOSCOPE LIGHTED BIOSEAL

## (undated) DEVICE — TROCAR: Brand: KII FIOS FIRST ENTRY

## (undated) DEVICE — LAPAROTOMY CDS: Brand: MEDLINE INDUSTRIES, INC.

## (undated) DEVICE — CURAD PAPER TAPE 2IN

## (undated) DEVICE — SUT ETHILON 3-0 FS-1 663H

## (undated) DEVICE — STERILE LATEX POWDER-FREE SURGICAL GLOVES WITH HYDROGEL COATING, SMOOTH FINISH, STRAIGHT FINGER: Brand: PROTEXIS

## (undated) DEVICE — TRADITIONAL MARYLAND DISSECTOR TIP, DISPOSABLE: Brand: RENEW

## (undated) DEVICE — SUT VICRYL 0 J912G

## (undated) DEVICE — SYRINGE 30ML LL TIP

## (undated) DEVICE — KIT CUSTOM ENDOPROCEDURE STERIS

## (undated) DEVICE — #10 STERILE BLADE: Brand: POLYMER COATED BLADES

## (undated) DEVICE — SUT VICRYL 0 CP-1 J267H

## (undated) DEVICE — DERMA+FLEX TISSUE ADHESIVE

## (undated) DEVICE — SOLUTION ENDOSCOPIC ANTI-FOG NON-TOXIC NON-ABRASIVE 6 CUBIC CENTIMETER WITH RADIOPAQUE ADHESIVE-BACKED SPONGE STERILE NOT MADE WITH NATURAL RUBBER LATEX MEDICHOICE: Brand: MEDICHOICE

## (undated) DEVICE — DRAPE LEGGING STERILE

## (undated) DEVICE — KIT VLV 5 PC AIR H2O SUCT BX ENDOGATOR CONN

## (undated) DEVICE — SUT PDS II 1 TP-1 Z880G

## (undated) DEVICE — CLOSING BUNDLE: Brand: MEDLINE INDUSTRIES, INC.

## (undated) DEVICE — PROXIMATE SKIN STAPLERS (35 WIDE) CONTAINS 35 STAINLESS STEEL STAPLES (FIXED HEAD): Brand: PROXIMATE

## (undated) DEVICE — STERILE POLYISOPRENE POWDER-FREE SURGICAL GLOVES WITH EMOLLIENT COATING: Brand: PROTEXIS

## (undated) DEVICE — TUBING CYSTO

## (undated) DEVICE — SLEEVE KENDALL SCD EXPRESS MED

## (undated) DEVICE — TROCAR: Brand: KII® SLEEVE

## (undated) DEVICE — [HIGH FLOW INSUFFLATOR,  DO NOT USE IF PACKAGE IS DAMAGED,  KEEP DRY,  KEEP AWAY FROM SUNLIGHT,  PROTECT FROM HEAT AND RADIOACTIVE SOURCES.]: Brand: PNEUMOSURE

## (undated) DEVICE — 10FT COMBINED O2 DELIVERY/CO2 MONITORING. FILTER WITH MICROSTREAM TYPE LUER: Brand: DUAL ADULT NASAL CANNULA

## (undated) DEVICE — SOL NACL IRRIG 0.9% 1000ML BTL

## (undated) DEVICE — SUT SILK 3-0 SH C013D